# Patient Record
Sex: FEMALE | Race: WHITE
[De-identification: names, ages, dates, MRNs, and addresses within clinical notes are randomized per-mention and may not be internally consistent; named-entity substitution may affect disease eponyms.]

---

## 2017-08-17 ENCOUNTER — HOSPITAL ENCOUNTER (OUTPATIENT)
Dept: HOSPITAL 39 - LAB.O | Age: 52
Discharge: HOME | End: 2017-08-17
Attending: FAMILY MEDICINE
Payer: COMMERCIAL

## 2017-08-17 DIAGNOSIS — I10: Primary | ICD-10-CM

## 2018-04-03 ENCOUNTER — HOSPITAL ENCOUNTER (OUTPATIENT)
Dept: HOSPITAL 39 - LAB.O | Age: 53
Discharge: HOME | End: 2018-04-03
Attending: PHYSICIAN ASSISTANT
Payer: COMMERCIAL

## 2018-04-03 DIAGNOSIS — R53.81: ICD-10-CM

## 2018-04-03 DIAGNOSIS — R06.02: Primary | ICD-10-CM

## 2018-04-03 DIAGNOSIS — R53.82: ICD-10-CM

## 2018-04-03 NOTE — RAD
EXAM DESCRIPTION: Chest,2 Views



CLINICAL HISTORY: SHORTNESS OF BREATH



COMPARISON: None



TECHNIQUE: PA/lateral



FINDINGS: There is no acute appearing cardiac or pulmonary

abnormality. Heart size is normal with normal pulmonary

vascularity. No pleural effusion or pneumothorax. Lungs are clear

with no consolidating infiltrate. Lateral view shows intact

sternum and T-spine.



IMPRESSION:



No acute process is identified in the chest.



Electronically signed by:  Jersey Zendejas MD  4/3/2018 5:14 PM

[facility] Workstation: [id number]

## 2018-04-03 NOTE — US
EXAM DESCRIPTION: Soft Tissue,Head/Neck



CLINICAL HISTORY: 53 years, Female, THYROID NODULE



COMPARISON: None.



FINDINGS: 

Thyroid ultrasound demonstrates a normal size, shape, and

echotexture of the thyroid gland.

.

The right lobe measures 4.5 x 1.6 x 1.6 cm.

The left lobe measures 4.4 x 1.4 x 1.2 cm.

There is no nodule or mass. No anterior cervical mass or

adenopathy.



IMPRESSION:



Normal thyroid sonogram.



Electronically signed by:  Jersey Zendejas MD  4/3/2018 5:15 PM

[facility] Workstation: [id number]

## 2018-04-05 ENCOUNTER — HOSPITAL ENCOUNTER (OUTPATIENT)
Dept: HOSPITAL 39 - LAB.O | Age: 53
Discharge: HOME | End: 2018-04-05
Attending: PHYSICIAN ASSISTANT
Payer: COMMERCIAL

## 2018-04-05 DIAGNOSIS — R53.81: Primary | ICD-10-CM

## 2018-04-05 DIAGNOSIS — R53.83: ICD-10-CM

## 2018-08-08 ENCOUNTER — HOSPITAL ENCOUNTER (OUTPATIENT)
Dept: HOSPITAL 39 - LAB.O | Age: 53
End: 2018-08-08
Attending: FAMILY MEDICINE
Payer: COMMERCIAL

## 2018-08-08 DIAGNOSIS — D50.8: Primary | ICD-10-CM

## 2018-10-02 ENCOUNTER — HOSPITAL ENCOUNTER (OUTPATIENT)
Dept: HOSPITAL 39 - LAB.O | Age: 53
End: 2018-10-02
Attending: FAMILY MEDICINE
Payer: COMMERCIAL

## 2018-10-02 DIAGNOSIS — I10: Primary | ICD-10-CM
